# Patient Record
Sex: FEMALE
[De-identification: names, ages, dates, MRNs, and addresses within clinical notes are randomized per-mention and may not be internally consistent; named-entity substitution may affect disease eponyms.]

---

## 2017-10-09 ENCOUNTER — HOSPITAL ENCOUNTER (EMERGENCY)
Dept: HOSPITAL 42 - ED | Age: 37
Discharge: HOME | End: 2017-10-09
Payer: COMMERCIAL

## 2017-10-09 VITALS — HEART RATE: 64 BPM | DIASTOLIC BLOOD PRESSURE: 76 MMHG | SYSTOLIC BLOOD PRESSURE: 114 MMHG | OXYGEN SATURATION: 98 %

## 2017-10-09 VITALS — RESPIRATION RATE: 18 BRPM

## 2017-10-09 VITALS — TEMPERATURE: 98.5 F

## 2017-10-09 DIAGNOSIS — R07.0: ICD-10-CM

## 2017-10-09 DIAGNOSIS — J39.2: Primary | ICD-10-CM

## 2017-10-09 LAB
ALBUMIN/GLOB SERPL: 1.4 {RATIO} (ref 1.1–1.8)
ALP SERPL-CCNC: 77 U/L (ref 38–126)
ALT SERPL-CCNC: 24 U/L (ref 7–56)
APPEARANCE UR: CLEAR
AST SERPL-CCNC: 28 U/L (ref 14–36)
BACTERIA #/AREA URNS HPF: (no result) /[HPF]
BASOPHILS # BLD AUTO: 0.02 K/MM3 (ref 0–2)
BASOPHILS NFR BLD: 0.2 % (ref 0–3)
BILIRUB SERPL-MCNC: 0.4 MG/DL (ref 0.2–1.3)
BILIRUB UR-MCNC: NEGATIVE MG/DL
BUN SERPL-MCNC: 12 MG/DL (ref 7–21)
CALCIUM SERPL-MCNC: 9.2 MG/DL (ref 8.4–10.5)
CHLORIDE SERPL-SCNC: 106 MMOL/L (ref 98–107)
CO2 SERPL-SCNC: 25 MMOL/L (ref 21–33)
COLOR UR: YELLOW
EOSINOPHIL # BLD: 0.1 10*3/UL (ref 0–0.7)
EOSINOPHIL NFR BLD: 1.5 % (ref 1.5–5)
ERYTHROCYTE [DISTWIDTH] IN BLOOD BY AUTOMATED COUNT: 12.8 % (ref 11.5–14.5)
GLOBULIN SER-MCNC: 3.2 GM/DL
GLUCOSE SERPL-MCNC: 86 MG/DL (ref 70–110)
GLUCOSE UR STRIP-MCNC: NEGATIVE MG/DL
GRANULOCYTES # BLD: 6.76 10*3/UL (ref 1.4–6.5)
GRANULOCYTES NFR BLD: 70.2 % (ref 50–68)
HCT VFR BLD CALC: 40.8 % (ref 36–48)
KETONES UR STRIP-MCNC: NEGATIVE MG/DL
LEUKOCYTE ESTERASE UR-ACNC: NEGATIVE LEU/UL
LYMPHOCYTES # BLD: 2.2 10*3/UL (ref 1.2–3.4)
LYMPHOCYTES NFR BLD AUTO: 22.4 % (ref 22–35)
MCH RBC QN AUTO: 29.1 PG (ref 25–35)
MCHC RBC AUTO-ENTMCNC: 35 G/DL (ref 31–37)
MCV RBC AUTO: 83.1 FL (ref 80–105)
MONOCYTES # BLD AUTO: 0.6 10*3/UL (ref 0.1–0.6)
MONOCYTES NFR BLD: 5.7 % (ref 1–6)
PH UR STRIP: 6.5 [PH] (ref 4.7–8)
PLATELET # BLD: 308 10^3/UL (ref 120–450)
PMV BLD AUTO: 10 FL (ref 7–11)
POTASSIUM SERPL-SCNC: 3.7 MMOL/L (ref 3.6–5)
PROT SERPL-MCNC: 7.8 G/DL (ref 5.8–8.3)
PROT UR STRIP-MCNC: NEGATIVE MG/DL
RBC # UR STRIP: (no result) /UL
SODIUM SERPL-SCNC: 142 MMOL/L (ref 132–148)
SP GR UR STRIP: <= 1.005 (ref 1–1.03)
UROBILINOGEN UR STRIP-ACNC: 0.2 E.U./DL
WBC # BLD AUTO: 9.6 10^3/UL (ref 4.5–11)
WBC #/AREA URNS HPF: (no result) /HPF (ref 0–6)

## 2017-10-09 PROCEDURE — 80053 COMPREHEN METABOLIC PANEL: CPT

## 2017-10-09 PROCEDURE — 71010: CPT

## 2017-10-09 PROCEDURE — 81001 URINALYSIS AUTO W/SCOPE: CPT

## 2017-10-09 PROCEDURE — 70491 CT SOFT TISSUE NECK W/DYE: CPT

## 2017-10-09 PROCEDURE — 99283 EMERGENCY DEPT VISIT LOW MDM: CPT

## 2017-10-09 PROCEDURE — 85025 COMPLETE CBC W/AUTO DIFF WBC: CPT

## 2017-10-09 NOTE — CT
EXAM:

  CT Neck With Intravenous Contrast



EXAM DATE/TIME:

  10/9/2017 5:32 PM



CLINICAL HISTORY:

  The patient age is 36 years old and is female; Pain; Throat pain; Additional 

info: 2 month history worsening pain/difficulty swallow Facility exam id and 

description: Ct neckst neck soft tissue w/contrast



TECHNIQUE:

  Axial computed tomography images of the neck with intravenous contrast.  All 

CT scans at this facility use one or more dose reduction techniques, viz.: 

automated exposure control; ma/kV adjustment per patient size (including 

targeted exams where dose is matched to indication; i.e. head); or iterative 

reconstruction technique.

  Coronal and sagittal reformatted images were created and reviewed.



CONTRAST:

  100 mL of omni administered intravenously.



COMPARISON:

  No relevant prior studies available.



FINDINGS:

  Nasopharynx:  There is swelling/thickening of the right posterior wall of the 

nasopharynx and adjacent soft palate.  This may be inflammatory, although a 

mass cannot be excluded.

  Oropharynx:  See above.

  Hypopharynx:  No acute abnormality.

  Larynx:  Normal epiglottis.

  Trachea:  No acute abnormality.

  Retropharyngeal space:  No acute abnormality.

  Submandibular/parotid glands:  Small intraparotid lymph nodes/nodules are 

visualized, which are subcentimeter in size.

  Thyroid:  No enlarged or calcified nodules.

  Bones/joints:  There is straightening of the lordotic curvature of the 

cervical spine.  

  Vasculature:  No acute findings.

  Lymph nodes:  Mild enlarged level I cervical lymph nodes are identified 

bilaterally.  On the left side, this measures 1.6 x 0.6 cm.  These lymph nodes 

are nonspecific as to etiology.  Additional small cervical lymph nodes are 

identified.

  Sinuses:  Mucous retention cysts or polyps are visualized within the left 

maxillary sinus.

  Lung apices:  Unremarkable as visualized.



IMPRESSION:     

1.  There is swelling/thickening of the right posterior wall of the nasopharynx 

and adjacent soft palate.  This may be inflammatory, although a mass cannot be 

excluded.  Further clinical evaluation is recommended.

2.  Mild enlarged level I cervical lymph nodes are identified bilaterally. 

These lymph nodes are nonspecific as to etiology.

3.  Small intraparotid lymph nodes/nodules are visualized, which are 

subcentimeter in size.

4.  Paranasal sinus disease is noted above.

## 2017-10-09 NOTE — ED PDOC
Arrival/HPI





- General


Chief Complaint: ENT Problem


Time Seen by Provider: 10/09/17 17:24


Historian: Patient, Family





- History of Present Illness


Narrative History of Present Illness (Text): 





10/09/17 18:40


36yr old female presents today sent in by [place] for evaluation of throat 

pain. pt states she has had painful swallowing x 2 months.  + decreased hearing 

x 2 month. + nasal congestion. + dry cough. pt also c/o 1 week history of left 

sided back pain. no cp or sob. no vomiting/diarrhea. pt also states that she 

has been drinking 15 bottles of water daily. denies urinary symptoms. no other 

complaints. 


10/09/17 18:43








Past Medical History





- Provider Review


Nursing Documentation Reviewed: Yes





- Travel History


Have you recently traveled outside [place] w/in the past 3 mons?: No





- Infectious Disease


Hx of Infectious Diseases: None





- Tetanus Immunization


Tetanus Immunization: Unknown





- Psychiatric


Hx Substance Use: No





- Anesthesia


Hx Anesthesia: No





Family/Social History





- Physician Review


Nursing Documentation Reviewed: Yes


Family/Social History: Unknown Family HX


Smoking Status: Never Smoked


Hx Alcohol Use: No


Hx Substance Use: No





Allergies/Home Meds


Allergies/Adverse Reactions: 


Allergies





No Known Allergies Allergy (Verified 10/09/17 17:16)


 








Home Medications: 


 Home Meds











 Medication  Instructions  Recorded  Confirmed


 


No Known Home Med  10/09/17 10/09/17














Review of Systems





- Review of Systems


Constitutional: Fevers.  absent: Fatigue


ENT: Sore Throat, Sinus Congestion


Respiratory: Cough.  absent: SOB, Wheezing


Cardiovascular: absent: Chest Pain, Palpitations


Gastrointestinal: absent: Abdominal Pain, Nausea, Vomiting


Musculoskeletal: Back Pain.  absent: Arthralgias, Neck Pain


Skin: absent: Rash, Pruritis


Neurological: absent: Headache, Dizziness





Physical Exam


Vital Signs Reviewed: Yes


Vital Signs











  Temp Pulse Resp BP Pulse Ox


 


 10/09/17 18:20   74  18  118/79  100


 


 10/09/17 17:20  98.5 F  78  19  120/81  100











Temperature: Afebrile


Blood Pressure: Normal


Pulse: Regular


Respiratory Rate: Normal


Appearance: Positive for: Well-Appearing, Non-Toxic, Comfortable


Pain Distress: None


Mental Status: Positive for: Alert and Oriented X 3





- Systems Exam


Head: Present: Atraumatic


Conjunctiva: Present: Normal


Ears: Present: Normal, NORMAL TM, Normal Canal.  No: Erythema


Mouth: Present: Moist Mucous Membranes, Normal Lips, Normal Tounge, Normal 

Teeth.  No: Dry, Drooling, Trismus


Pharnyx: Present: Normal.  No: ERYTHEMA, EXUDATE, TONSILS ENLARGED, 

Peritonsilar Swelling, Uvular Deviation, Muffled/Hoarse Voice, Strider, Soft 

Palate/Uvular Edema


Nose (External): Present: Atraumatic


Nose (Internal): Present: Normal Inspection


Neck: Present: Normal Range of Motion, Lymphadenopathy, Trachea Midline


Respiratory/Chest: Present: Clear to Auscultation, Good Air Exchange.  No: 

Respiratory Distress, Accessory Muscle Use


Cardiovascular: Present: Regular Rate and Rhythm, Normal S1, S2.  No: Murmurs


Abdomen: No: Tenderness


Back: Present: Normal Inspection, Other (+ minimal ttp over left upper back; no 

step offs or crepitus, no edema no erythema, no ecchymosis; no rash. ).  No: 

CVA Tenderness, Midline Tenderness


Upper Extremity: Present: Normal ROM


Lower Extremity: Present: Normal ROM


Neurological: Present: GCS=15, Speech Normal


Skin: Present: Warm, Dry, Normal Color.  No: Rashes


Lymphatic: Present: Cervical Adenopathy


Psychiatric: Present: Alert, Oriented x 3





Medical Decision Making


ED Course and Treatment: 





10/09/17 18:45


36yr old female with 2 month history of difficulty swallowing, b/l ear pain.





cbc: wnl


cmp; wnl 





UA: 





cxr: wnl





ct soft tissue neck; FINDINGS:


Nasopharynx: There is swelling/thickening of the right posterior wall of the 

nasopharynx and


adjacent soft palate. This may be inflammatory, although a mass cannot be 

excluded.


Oropharynx: See above.


Hypopharynx: No acute abnormality.


Larynx: Normal epiglottis.


Trachea: No acute abnormality.


Retropharyngeal space: No acute abnormality.


Submandibular/parotid glands: Small intraparotid lymph nodes/nodules are 

visualized, which are


subcentimeter in size.


Thyroid: No enlarged or calcified nodules.


Bones/joints: There is straightening of the lordotic curvature of the cervical 

spine.


Vasculature: No acute findings.


Lymph nodes: Mild enlarged level I cervical lymph nodes are identified 

bilaterally. On the left side,


this measures 1.6 x 0.6 cm. These lymph nodes are nonspecific as to etiology. 

Additional small


cervical lymph nodes are identified.


Sinuses: Mucous retention cysts or polyps are visualized within the left 

maxillary sinus.


Lung apices: Unremarkable as visualized.


IMPRESSION:


1. There is swelling/thickening of the right posterior wall of the nasopharynx 

and adjacent soft palate.


This may be inflammatory, although a mass cannot be excluded. Further clinical 

evaluation is


recommended.


2. Mild enlarged level I cervical lymph nodes are identified bilaterally. These 

lymph nodes are


nonspecific as to etiology.


3. Small intraparotid lymph nodes/nodules are visualized, which are 

subcentimeter in size.


4. Paranasal sinus disease is noted above.








i discussed case in depth with dr. nava; (ENT on call); he states that 

patient will need f/u outpatient for scope. pt can f/u in the office or at 

[facility] ENT clinic. 





10/09/17 21:15


all results discussed in depth with patient and boyfriend using  phone

;  #[id number]. 


advised patient and boyfriend of CT findings of inflammation vs Mass. stressed 

importance of f/u with ENT specialist. 








impression; possible nasopharyneal mass


Follow up with ENT specialist within the next 2 days. 


Follow up with the primary care physician within the next 2 days. 


return immediately if symptoms worsen, persist or if new symptoms develop. 








- Lab Interpretations


Lab Results: 








 10/09/17 18:30 





 10/09/17 18:30 





 Lab Results





10/09/17 18:30: WBC 9.6, RBC 4.91, Hgb 14.3, Hct 40.8, MCV 83.1, MCH 29.1, MCHC 

35.0, RDW 12.8, Plt Count 308, MPV 10.0, Gran % 70.2 H, Lymph % (Auto) 22.4, 

Mono % (Auto) 5.7, Eos % (Auto) 1.5, Baso % (Auto) 0.2, Gran # 6.76 H, Lymph # 

2.2, Mono # 0.6, Eos # 0.1, Baso # 0.02


10/09/17 18:30: Sodium 142, Potassium 3.7, Chloride 106, Carbon Dioxide 25, 

Anion Gap 15, BUN 12, Creatinine 0.6 L, Est GFR ( Amer) > 60, Est GFR (

Non-Af Amer) > 60, Random Glucose 86, Calcium 9.2, Total Bilirubin 0.4, AST 28, 

ALT 24, Alkaline Phosphatase 77, Total Protein 7.8, Albumin 4.6, Globulin 3.2, 

Albumin/Globulin Ratio 1.4











- RAD Interpretation


Radiology Orders: 








10/09/17 17:32


NECK SOFT TISSUE W/CONTRAST [CT] Stat 


CHEST PORTABLE [RAD] Stat 














- Medication Orders


Current Medication Orders: 











Discontinued Medications





Sodium Chloride (Sodium Chloride 0.9%)  500 mls @ 999 mls/hr IV .Q31M STA


   Stop: 10/09/17 18:03


   Last Admin: 10/09/17 18:30  Dose: 999 mls/hr





eMAR Start Stop


 Document     10/09/17 18:30  LA  (Rec: 10/09/17 18:39  LA  [id number])


     Intravenous Solution


      Start Date                                 10/09/17


      Start Time                                 18:30


      End Date                                   10/09/17


      End time                                   19:00


      Total Infusion Time                        30














Disposition/Present on Arrival





- Present on Arrival


Any Indicators Present on Arrival: No


History of DVT/PE: No


History of Uncontrolled Diabetes: No


Urinary Catheter: No


History of Decub. Ulcer: No


History Surgical Site Infection Following: None





- Disposition


Have Diagnosis and Disposition been Completed?: Yes


Diagnosis: 


 Nasopharyngeal mass, Throat pain





Disposition: HOME/ ROUTINE


Disposition Time: 21:22


Patient Plan: Discharge


Condition: GOOD


Additional Instructions: 


Follow up with ENT specialist within the next 2 days. 


Follow up with the primary care physician within the next 2 days. 


return immediately if symptoms worsen, persist or if new symptoms develop. 


Referrals: 


Dereck Nava DO [Doctor Osteopathy] - Follow up with primary


Osbaldo James MD [Staff Provider] - Follow up with primary


[facility] [place] - Follow up with primary


[facility] [Outside] - Follow up with primary


Forms:  [employer] (Faroese), WORK NOTE

## 2018-04-23 ENCOUNTER — HOSPITAL ENCOUNTER (EMERGENCY)
Dept: HOSPITAL 42 - ED | Age: 38
Discharge: HOME | End: 2018-04-23
Payer: COMMERCIAL

## 2018-04-23 VITALS — RESPIRATION RATE: 18 BRPM | TEMPERATURE: 97.9 F

## 2018-04-23 VITALS — SYSTOLIC BLOOD PRESSURE: 125 MMHG | HEART RATE: 72 BPM | DIASTOLIC BLOOD PRESSURE: 74 MMHG | OXYGEN SATURATION: 100 %
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
## 2018-04-23 DIAGNOSIS — O23.40: Primary | ICD-10-CM

## 2018-04-23 DIAGNOSIS — Z3A.00: ICD-10-CM

## 2018-04-23 DIAGNOSIS — S86.912A: ICD-10-CM
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
## 2018-04-23 LAB
ALBUMIN SERPL-MCNC: 4.1 G/DL (ref 3–4.8)
ALBUMIN/GLOB SERPL: 1.5 {RATIO} (ref 1.1–1.8)
ALT SERPL-CCNC: 29 U/L (ref 7–56)
APPEARANCE UR: CLEAR
AST SERPL-CCNC: 36 U/L (ref 14–36)
BILIRUB UR-MCNC: NEGATIVE MG/DL
BUN SERPL-MCNC: 4 MG/DL (ref 7–21)
CALCIUM SERPL-MCNC: 8.6 MG/DL (ref 8.4–10.5)
COLOR UR: YELLOW
EPI CELLS #/AREA URNS HPF: (no result) /HPF (ref 0–5)
ERYTHROCYTE [DISTWIDTH] IN BLOOD BY AUTOMATED COUNT: 13.1 % (ref 11.5–14.5)
GFR NON-AFRICAN AMERICAN: > 60
GLUCOSE UR STRIP-MCNC: NEGATIVE MG/DL
HCG,QUALITATIVE URINE: POSITIVE
HGB BLD-MCNC: 12.2 G/DL (ref 12–16)
LEUKOCYTE ESTERASE UR-ACNC: NEGATIVE LEU/UL
MCH RBC QN AUTO: 28.1 PG (ref 25–35)
MCHC RBC AUTO-ENTMCNC: 34.9 G/DL (ref 31–37)
MCV RBC AUTO: 80.6 FL (ref 80–105)
PH UR STRIP: 6.5 [PH] (ref 4.7–8)
PLATELET # BLD: 266 10^3/UL (ref 120–450)
PMV BLD AUTO: 9.2 FL (ref 7–11)
PROT UR STRIP-MCNC: NEGATIVE MG/DL
RBC # BLD AUTO: 4.34 10^6/UL (ref 3.5–6.1)
RBC # UR STRIP: (no result) /UL
RBC #/AREA URNS HPF: (no result) /HPF (ref 0–2)
SP GR UR STRIP: <= 1.005 (ref 1–1.03)
UROBILINOGEN UR STRIP-ACNC: 0.2 E.U./DL
WBC # BLD AUTO: 8.7 10^3/UL (ref 4.5–11)
WBC #/AREA URNS HPF: (no result) /HPF (ref 0–6)

## 2018-04-23 PROCEDURE — 76817 TRANSVAGINAL US OBSTETRIC: CPT

## 2018-04-23 PROCEDURE — 86850 RBC ANTIBODY SCREEN: CPT

## 2018-04-23 PROCEDURE — 81001 URINALYSIS AUTO W/SCOPE: CPT

## 2018-04-23 PROCEDURE — 99284 EMERGENCY DEPT VISIT MOD MDM: CPT

## 2018-04-23 PROCEDURE — 80053 COMPREHEN METABOLIC PANEL: CPT

## 2018-04-23 PROCEDURE — 86900 BLOOD TYPING SEROLOGIC ABO: CPT

## 2018-04-23 PROCEDURE — 84702 CHORIONIC GONADOTROPIN TEST: CPT

## 2018-04-23 PROCEDURE — 85027 COMPLETE CBC AUTOMATED: CPT

## 2018-04-23 PROCEDURE — 84703 CHORIONIC GONADOTROPIN ASSAY: CPT

## 2018-04-23 PROCEDURE — 93970 EXTREMITY STUDY: CPT

## 2018-04-23 NOTE — US
EXAM:

  US Pregnancy First Trimester, Transabdominal

  US Pregnancy, Transvaginal



CLINICAL HISTORY:

  37 years old, female; Pain; Pregnancy complicated by abdominal or pelvic 

pain; Lower; First trimester; Gestational age or lmp: 03/01/2018; Pregnant



TECHNIQUE:

  Real-time transabdominal and transvaginal obstetrical ultrasound of the 

maternal pelvis and a first trimester pregnancy with image documentation.  

Transvaginal imaging was used for better evaluation of the fetus and adnexa.



COMPARISON:

  No relevant prior studies available.



FINDINGS:

  Gestation:  0.5 x 0.4 x 0.5 cm saclike structure within uterus. No yolk sac. 

No fetal pole.

  Uterus/cervix:  Retroverted uterus.  Two uterine masses, larger measuring 3.2 

x 3.3 x 4.6 cm.  Endometrium: 2.9 cm in thickness.  Closed cervix.

  Ovaries:  Normal ovaries.  No adnexal masses.

  Free fluid:  No significant free fluid.



IMPRESSION:     

1.  Sac without fetal pole or yolk sac. DDX: Early IUP, blighted ovum, ectopic 

pregnancy (with pseudogestational sac).  Followup is recommended.

2.  Probable fibroid uterus.

## 2018-04-23 NOTE — US
HISTORY:

Leg pain and swelling. Evaluate for DVT



PHYSICIAN(S):  Jethro Patel MD.



TECHNIQUE:

Duplex sonography and color-flow Doppler with graded compression were 

used to evaluate the deep venous systems of both lower extremities. 



FINDINGS:

The visualized deep venous systems of both lower extremities are 

sonographically normal and compressible. Normal wave forms and 

augmentation are seen. There is no sonographic evidence for deep 

venous thrombosis in the visualized segments of both lower 

extremities.



IMPRESSION:

No sonographic evidence for deep venous thrombosis in the visualized 

segments of both lower extremities.

## 2018-04-23 NOTE — ED PDOC
Arrival/HPI





- General


Chief Complaint: Abdominal Pain


Time Seen by Provider: 04/23/18 04:24


Historian: Patient, Family





- History of Present Illness


Narrative History of Present Illness (Text): 


04/23/18 04:41


Zenia Wright is a 37 year old female who presents to the Emergency 

department accompanied by relative complaining of dysuria for the past 2 days, 

Patient states her last normal menstrual period was on 03/20/2018. Patient also 

complaining left lower leg pain for the past 2 days. Patient denies any fever, 

chills, chest pain, shortness of breath, nausea, vomiting, diarrhea, neck pain, 

headache, dizziness,vaginal discharge or any other complaints.





Symptom Onset: Gradual


Symptom Course: Unchanged


Activities at Onset: Light


Context: Home





Past Medical History





- Provider Review


Nursing Documentation Reviewed: Yes





- Infectious Disease


Hx of Infectious Diseases: None





- Tetanus Immunization


Tetanus Immunization: Unknown





- Psychiatric


Hx Substance Use: No





- Anesthesia


Hx Anesthesia: No





Family/Social History





- Physician Review


Nursing Documentation Reviewed: Yes


Family/Social History: Unknown Family HX


Smoking Status: Never Smoked


Hx Alcohol Use: No


Hx Substance Use: No





Allergies/Home Meds


Allergies/Adverse Reactions: 


Allergies





No Known Allergies Allergy (Verified 04/23/18 04:00)


 











Review of Systems





- Physician Review


All systems were reviewed & negative as marked: Yes





- Review of Systems


Constitutional: Normal.  absent: Fevers


Eyes: Normal


ENT: Normal


Respiratory: Normal.  absent: SOB, Cough


Cardiovascular: Normal.  absent: Chest Pain


Gastrointestinal: Normal.  absent: Abdominal Pain, Diarrhea, Nausea, Vomiting


Genitourinary Female: Dysuria.  absent: Hematuria, Urine Output Changes, 

Vaginal Bleeding


Musculoskeletal: Other (+left leg pain).  absent: Back Pain, Neck Pain


Skin: Normal.  absent: Rash


Neurological: Normal.  absent: Headache, Dizziness


Endocrine: Normal


Hemo/Lymphatic: Normal


Psychiatric: Normal





Physical Exam


Vital Signs Reviewed: Yes


Vital Signs











  Temp Pulse Resp BP Pulse Ox


 


 04/23/18 05:46   84  18  118/74  99


 


 04/23/18 04:01  97.9 F  87  18  120/75  98











Temperature: Afebrile


Blood Pressure: Normal


Pulse: Regular


Respiratory Rate: Normal


Appearance: Positive for: Well-Appearing, Non-Toxic, Comfortable


Pain Distress: None


Mental Status: Positive for: Alert and Oriented X 3





- Systems Exam


Head: Present: Atraumatic, Normocephalic


Pupils: Present: PERRL


Extroacular Muscles: Present: EOMI


Conjunctiva: Present: Normal


Mouth: Present: Moist Mucous Membranes


Neck: Present: Normal Range of Motion


Respiratory/Chest: Present: Clear to Auscultation, Good Air Exchange.  No: 

Respiratory Distress, Accessory Muscle Use


Cardiovascular: Present: Regular Rate and Rhythm, Normal S1, S2.  No: Murmurs


Abdomen: No: Tenderness, Distention, Peritoneal Signs


Back: Present: Normal Inspection.  No: CVA Tenderness, Midline Tenderness, 

Paraspinal Tenderness


Upper Extremity: Present: Normal Inspection.  No: Cyanosis, Edema


Lower Extremity: Present: Normal Inspection, NORMAL PULSES, Normal ROM, 

Neurovascularly Intact.  No: Edema, CALF TENDERNESS, Cyanosis, Be's Sign, 

Tenderness, Swelling, Erythema, Deformity, Temperature Abnormalties


Neurological: Present: GCS=15, CN II-XII Intact, Speech Normal, Motor Func 

Grossly Intact, Normal Sensory Function


Skin: Present: Warm, Dry, Normal Color.  No: Rashes


Psychiatric: Present: Alert, Oriented x 3, Normal Insight, Normal Concentration





Medical Decision Making


ED Course and Treatment: 


04/23/18 04:41


Impression:


37 year old female complaining of dysuria and left leg pain x 2 days.  





Plan:


-- Labs, blood type and screen, Beta-HCG


-- Urinalysis


-- Transvaginal US


-- US Duplex Lower Extremities


-- Reassess and disposition





Progress Notes:





04/23/18 06:38


Pt. feels fine in ED.Pt. is G2 AB1.Will commence on Macrobid antibiotics for 

UTI.Pt. with early pregnancy advised follow up with her gynecologist this 

week.States she will do so.





- Lab Interpretations


Lab Results: 








 04/23/18 04:50 





 04/23/18 04:50 





 Lab Results





04/23/18 04:50: Blood Type O POSITIVE, Antibody Screen Negative, BBK History 

Checked No verified bt


04/23/18 04:50: Beta HCG, Quant 1009.10 H


04/23/18 04:50: WBC 8.7, RBC 4.34, Hgb 12.2  D, Hct 35.0 L, MCV 80.6, MCH 28.1, 

MCHC 34.9, RDW 13.1, Plt Count 266, MPV 9.2


04/23/18 04:50: Sodium 143, Potassium 3.5 L, Chloride 108 H, Carbon Dioxide 22, 

Anion Gap 16, BUN 4 L, Creatinine 0.6 L, Est GFR ( Amer) > 60, Est GFR (

Non-Af Amer) > 60, Random Glucose 102, Calcium 8.6, Total Bilirubin 0.3, AST 36

  D, ALT 29, Alkaline Phosphatase 50, Total Protein 6.9, Albumin 4.1, Globulin 

2.8, Albumin/Globulin Ratio 1.5


04/23/18 04:20: Urine Color Yellow, Urine Appearance Clear, Urine pH 6.5, Ur 

Specific Gravity <= 1.005, Urine Protein Negative, Urine Glucose (UA) Negative, 

Urine Ketones Negative, Urine Blood Trace-intact H, Urine Nitrate Negative, 

Urine Bilirubin Negative, Urine Urobilinogen 0.2, Ur Leukocyte Esterase Negative

, Urine RBC 0 - 2, Urine WBC 0 - 2, Ur Epithelial Cells 0 - 2, Urine HCG, Qual 

Positive











- RAD Interpretation


Narrative RAD Interpretations (Text): 





04/23/18 06:17


Venous doppler lower extremities-Negative for DVT


Radiology Orders: 








04/23/18 04:45


DUPLEX LOWER EXTRM VEIN BILAT [US] Stat 





04/23/18 04:46


OB TRANSVAGINAL PREGNANCY [US] Stat 














- Medication Orders


Current Medication Orders: 








Sodium Chloride (Sodium Chloride 0.9%)  500 mls @ 500 mls/hr IV .Q1H STA


   Stop: 04/23/18 07:20


   Last Admin: 04/23/18 06:29  Dose: 500 mls/hr





eMAR Start Stop


 Document     04/23/18 06:29  KEN  (Rec: 04/23/18 06:29  J[facility]  [id number]


     Intravenous Solution


      Start Date                                 04/23/18


      Start Time                                 06:29


      End Date                                   04/23/18


      End time                                   06:59


      Total Infusion Time                        30





Nitrofurantoin Macrocrystals (Macrobid)  100 mg PO ONCE ONE


   PRN Reason: Protocol


   Stop: 04/23/18 06:37











- Scribe Statement


The provider has reviewed the documentation as recorded by the Teodoroibdariusz Biswas





Provider Scribe Attestation:


All medical record entries made by the Scribe were at my direction and 

personally dictated by me. I have reviewed the chart and agree that the record 

accurately reflects my personal performance of the history, physical exam, 

medical decision making, and the department course for this patient. I have 

also personally directed, reviewed, and agree with the discharge instructions 

and disposition.








Disposition/Present on Arrival





- Present on Arrival


Any Indicators Present on Arrival: No


History of DVT/PE: No


History of Uncontrolled Diabetes: No


Urinary Catheter: No


History of Decub. Ulcer: No


History Surgical Site Infection Following: None





- Disposition


Have Diagnosis and Disposition been Completed?: Yes


Diagnosis: 


 Early stage of pregnancy, UTI (urinary tract infection), Muscle strain, lower 

leg





Disposition: HOME/ ROUTINE


Disposition Time: 06:40


Patient Plan: Discharge


Condition: STABLE


Discharge Instructions (ExitCare):  Urinary Tract Infection, Adult (DC), 

Pregnancy - The First Month, Muscle Strain (DC)


Additional Instructions: 


Drink plenty of liquids/take meds as prescribed/avoid strenuous physical 

activity/follow up with your gynecologist this week


Prescriptions: 


Nitrofurantoin Macrocrystals [Macrobid] 100 mg PO BID #6 cap


Forms:  [employer] (English)

## 2018-05-09 ENCOUNTER — HOSPITAL ENCOUNTER (EMERGENCY)
Dept: HOSPITAL 42 - ED | Age: 38
Discharge: HOME | End: 2018-05-09
Payer: COMMERCIAL

## 2018-05-09 VITALS
RESPIRATION RATE: 19 BRPM | SYSTOLIC BLOOD PRESSURE: 125 MMHG | DIASTOLIC BLOOD PRESSURE: 80 MMHG | HEART RATE: 90 BPM | TEMPERATURE: 98 F

## 2018-05-09 VITALS — BODY MASS INDEX: 26 KG/M2

## 2018-05-09 VITALS — OXYGEN SATURATION: 100 %

## 2018-05-09 DIAGNOSIS — Z3A.01: ICD-10-CM

## 2018-05-09 DIAGNOSIS — O20.0: Primary | ICD-10-CM

## 2018-05-09 DIAGNOSIS — O98.811: ICD-10-CM

## 2018-05-09 LAB
ALBUMIN SERPL-MCNC: 4.7 G/DL (ref 3–4.8)
ALBUMIN/GLOB SERPL: 1.4 {RATIO} (ref 1.1–1.8)
ALT SERPL-CCNC: 54 U/L (ref 7–56)
APPEARANCE UR: CLEAR
AST SERPL-CCNC: 33 U/L (ref 14–36)
BASOPHILS # BLD AUTO: 0.01 K/MM3 (ref 0–2)
BASOPHILS NFR BLD: 0.1 % (ref 0–3)
BILIRUB UR-MCNC: NEGATIVE MG/DL
BUN SERPL-MCNC: 5 MG/DL (ref 7–21)
CALCIUM SERPL-MCNC: 9.1 MG/DL (ref 8.4–10.5)
COLOR UR: YELLOW
EOSINOPHIL # BLD: 0.1 10*3/UL (ref 0–0.7)
EOSINOPHIL NFR BLD: 0.5 % (ref 1.5–5)
ERYTHROCYTE [DISTWIDTH] IN BLOOD BY AUTOMATED COUNT: 13.2 % (ref 11.5–14.5)
GFR NON-AFRICAN AMERICAN: > 60
GLUCOSE UR STRIP-MCNC: NEGATIVE MG/DL
GRANULOCYTES # BLD: 8.85 10*3/UL (ref 1.4–6.5)
GRANULOCYTES NFR BLD: 74.2 % (ref 50–68)
HGB BLD-MCNC: 13.3 G/DL (ref 12–16)
LEUKOCYTE ESTERASE UR-ACNC: NEGATIVE LEU/UL
LYMPHOCYTES # BLD: 2.4 10*3/UL (ref 1.2–3.4)
LYMPHOCYTES NFR BLD AUTO: 19.8 % (ref 22–35)
MCH RBC QN AUTO: 28.7 PG (ref 25–35)
MCHC RBC AUTO-ENTMCNC: 35.4 G/DL (ref 31–37)
MCV RBC AUTO: 81 FL (ref 80–105)
MONOCYTES # BLD AUTO: 0.7 10*3/UL (ref 0.1–0.6)
MONOCYTES NFR BLD: 5.4 % (ref 1–6)
PH UR STRIP: 6 [PH] (ref 4.7–8)
PLATELET # BLD: 294 10^3/UL (ref 120–450)
PMV BLD AUTO: 9.2 FL (ref 7–11)
PROT UR STRIP-MCNC: NEGATIVE MG/DL
RBC # BLD AUTO: 4.64 10^6/UL (ref 3.5–6.1)
RBC # UR STRIP: (no result) /UL
RBC #/AREA URNS HPF: (no result) /HPF (ref 0–2)
SP GR UR STRIP: <= 1.005 (ref 1–1.03)
UROBILINOGEN UR STRIP-ACNC: 0.2 E.U./DL
WBC # BLD AUTO: 11.9 10^3/UL (ref 4.5–11)
WBC #/AREA URNS HPF: NEGATIVE /HPF (ref 0–6)

## 2018-05-09 NOTE — ED PDOC
Arrival/HPI





<Rachelle Sanchez - Last Filed: 18 21:07>





<Arielle Razo - Last Filed: 18 21:39>





- General


Chief Complaint: Abdominal Pain


Time Seen by Provider: 18 18:43





- History of Present Illness


Narrative History of Present Illness (Text): 





18 19:19


Patient is a 37 year old pregnant female with a past medical history of 

miscarriage at 2 months in  who presents to the Emergency department 

complaining of abdominal pain, and vaginal discharge with bleeding. Patient 

says this started a few weeks ago. At that time she came to the hospital and 

was given monostat 3 and augmentin, and also found out she was pregnant. 

Patient took the medications as directed finishing them 1 week ago, but her 

"belly button pain" worsened to the point where she felt like her " belly 

button was going to explode". Patient says the thick yellow vaginal discharge 

did not resolve either and yesterday she noticed red streaking throughout the 

discharge. Patient admits to chills and vomiting. She denies fever, dizziness, 

headache, diarrhea, constipation, changes in urinary habits, vaginal lesions, 

and chest pain/shortness of breath.





PMH: denies 


Meds: denies (other than those temporary listed above)


Allergies: NKDA


PSH: denies 


FH: denies 


SH: denies tobacco, alcohol, and elicit drug use


18 20:32


 (Rachelle Sanchez)





Past Medical History





- Infectious Disease


Hx of Infectious Diseases: None





- Tetanus Immunization


Tetanus Immunization: Unknown





- Psychiatric


Hx Substance Use: No





- Anesthesia


Hx Anesthesia: No





<Rachelle Sanchez - Last Filed: 18 21:07>





Family/Social History


Family/Social History: No Known Family HX


Smoking Status: Never Smoked


Hx Alcohol Use: No


Hx Substance Use: No





<Rachelle Sanchez - Last Filed: 18 21:07>





Allergies/Home Meds





<Rachelle Sanchez - Last Filed: 18 21:07>





<Arielle Razo - Last Filed: 18 21:39>


Allergies/Adverse Reactions: 


Allergies





No Known Allergies Allergy (Verified 18 04:00)


 











Review of Systems





- Physician Review


All systems were reviewed & negative as marked: Yes





- Review of Systems


Constitutional: Normal


Eyes: Normal


ENT: Rhinorrhea


Respiratory: Normal


Cardiovascular: Normal


Gastrointestinal: Abdominal Pain, Vomiting.  absent: Constipation, Diarrhea, 

Hematemesis


Genitourinary Female: Vaginal Bleeding, Vaginal Discharge.  absent: Dysuria, 

Frequency, Hematuria


Musculoskeletal: Normal


Skin: Normal


Neurological: Normal


Psychiatric: Anxiety (worried she will lose this pregnancy)





<Rachelle Sanchez - Last Filed: 18 21:07>





Physical Exam





- Systems Exam


Head: Present: Atraumatic, Normocephalic


Pupils: Present: PERRL


Extroacular Muscles: Present: EOMI


Conjunctiva: Present: Normal


Mouth: Present: Moist Mucous Membranes


Neck: Present: Normal Range of Motion


Respiratory/Chest: Present: Clear to Auscultation, Good Air Exchange.  No: 

Respiratory Distress, Accessory Muscle Use


Cardiovascular: Present: Regular Rate and Rhythm, Normal S1, S2.  No: Murmurs


Abdomen: Present: Tenderness (periumbilical), Guarding (voluntary).  No: 

Distention, Peritoneal Signs


Upper Extremity: Present: Normal Inspection


Lower Extremity: Present: Normal Inspection


Neurological: Present: GCS=15, Speech Normal


Skin: Present: Warm, Dry, Normal Color.  No: Rashes


Psychiatric: Present: Alert, Oriented x 3, Normal Insight, Normal Concentration





<Rachelle Sanchez - Last Filed: 18 21:07>


Vital Signs











  Temp Pulse Resp BP Pulse Ox


 


 18 18:33  98.2 F  91 H  18  122/79  100














Medical Decision Making





<Rachelle Sanchez - Last Filed: 18 21:07>





<Arielle Razo - Last Filed: 18 21:39>


ED Course and Treatment: 


Patient Seen With Resident:


In agreement with resident note which contains more details about the patient. 

Patient was seen and evaluated with resident. Came up with plan and treatment 

together. Vaginal discharge is white and clumpy with itchiness.  





18 20:59


Gestation: Single live intrauterine pregnancy. Ultrasound image is 6 weeks and 

4 days. Fetal heart


rate is 143 beats per minute.


Placenta/amniotic fluid: Cannot be adequately evaluated due to the early 

gestational age.


Uterus/cervix: myomas noted, intramural left 4.1 x 3.6 cm myoma and 1.1 x 1.2 

cm anterior


intramural myoma.


Ovaries: Cysts in the left ovary measuring 2.3 x 2 cm.


Free fluid: No free fluid.


IMPRESSION:


Single live intrauterine pregnancy. Ultrasound image is 6 weeks and 4 days. 

Fetal heart rate is 143


beats per minute.





18 21:37


 (Arielle Razo)





- Lab Interpretations


Lab Results: 








 18 18:55 





 18 18:55 





 Lab Results





18 18:55: Blood Type O POSITIVE, Antibody Screen Negative, BBK History 

Checked Patient has bt


18 18:55: Beta HCG, Quant [id number] H


18 18:55: Sodium 144, Potassium 3.5 L, Chloride 106, Carbon Dioxide 20 L, 

Anion Gap 21 H, BUN 5 L, Creatinine 0.5 L, Est GFR ( Amer) > 60, Est GFR 

(Non-Af Amer) > 60, Random Glucose 93, Calcium 9.1, Total Bilirubin 0.4, AST 33

, ALT 54, Alkaline Phosphatase 56, Total Protein 8.0, Albumin 4.7, Globulin 3.3

, Albumin/Globulin Ratio 1.4


18 18:55: Urine Color Yellow, Urine Appearance Clear, Urine pH 6.0, Ur 

Specific Gravity <= 1.005, Urine Protein Negative, Urine Glucose (UA) Negative, 

Urine Ketones 15 H, Urine Blood Small H, Urine Nitrate Negative, Urine 

Bilirubin Negative, Urine Urobilinogen 0.2, Ur Leukocyte Esterase Negative, 

Urine RBC 0 - 2, Urine WBC Negative


18 18:55: WBC 11.9 H D, RBC 4.64, Hgb 13.3, Hct 37.6, MCV 81.0, MCH 28.7, 

MCHC 35.4, RDW 13.2, Plt Count 294, MPV 9.2, Gran % 74.2 H, Lymph % (Auto) 19.8 

L, Mono % (Auto) 5.4, Eos % (Auto) 0.5 L, Baso % (Auto) 0.1, Gran # 8.85 H, 

Lymph # (Auto) 2.4, Mono # (Auto) 0.7 H, Eos # (Auto) 0.1, Baso # (Auto) 0.01











- RAD Interpretation


Radiology Orders: 








18 18:43


OB TRANSVAGINAL PREGNANCY [US] Stat 














- PA / NP / Resident Statement


MD/DO has reviewed & agrees with the documentation as recorded.


MD/DO has examined the patient and agrees with the treatment plan.





<Rachelle Sanchez - Last Filed: 18 21:07>





- Scribe Statement


The provider has reviewed the documentation as recorded by the Scribe





<Arielle Razo - Last Filed: 18 21:39>





- Scribe Statement


Kelsie Lima





Provider Scribe Attestation:


All medical record entries made by the Scribe were at my direction and 

personally dictated by me. I have reviewed the chart and agree that the record 

accurately reflects my personal performance of the history, physical exam, 

medical decision making, and the department course for this patient. I have 

also personally directed, reviewed, and agree with the discharge instructions 

and disposition.





 (Arielle Razo)





Disposition/Present on Arrival





- Present on Arrival


History of DVT/PE: No


History of Uncontrolled Diabetes: No


Urinary Catheter: No


History of Decub. Ulcer: No


History Surgical Site Infection Following: None





<DanielRachelle - Last Filed: 18 21:07>





- Present on Arrival


Any Indicators Present on Arrival: No





- Disposition


Have Diagnosis and Disposition been Completed?: Yes


Disposition Time: 20:59


Patient Plan: Discharge





<Arielle Razo - Last Filed: 18 21:39>





- Disposition


Diagnosis: 


 Threatened , Yeast infection





Disposition: HOME/ ROUTINE


Patient Problems: 


 Current Active Problems











Problem Status Onset


 


Threatened  Acute  











Condition: GOOD


Discharge Instructions (ExitCare):  Threatened Miscarriage


Print Language: Surinamese


Additional Instructions: 


Follow-up with OB within 2 days.  Start monistat 7.  Return to ED if condition 

worsens.  


Referrals: 


[facility], [Primary Care Provider] - Follow up with primary


Forms:  [employer] (Hong Konger), WORK NOTE

## 2018-05-10 NOTE — US
HISTORY:

pregnant, vaginal bleeding ; last menstrual period is reported 

03/20/2018 with estimated gestational age of 7 weeks 1 day. 



COMPARISON:

None available.



TECHNIQUE:

Transvaginal pelvic ultrasound was performed with longitudinal and 

transverse images submitted for interpretation.



FINDINGS:



UTERUS:

Measures 11.5 x 6.5 x 8.1 cm. There is a inhomogeneous but 

predominate hypoechoic mass identified at the lower uterine segment 

at the left side of the fundus bridging submucous and sub serosal 

locations measuring 4.2 x 3.6 x 3.7 cm.  Internal calcifications are 

identified shadowing posteriorly with the lesion compatible with a 

fibroid. There is an additional hypoechoic partially shadowing lesion 

at the right parasagittal fundus intramural in location measuring 1.2 

x 0.9 x 1.1 cm compatible with an additional fibroid. No additional 

myometrial lesion otherwise.



ENDOMETRIUM:

A gestational sac is identified within the endometrial cavity with 

yolk sac and fetal pole identified. Hypoechoic heterogeneous echo 

signals are appreciated at the anterosuperior edge of the chorion 

raising question of anterior decidual hemorrhage. The mean sac 

diameter is 2.0 cm with yolk sac measuring 0.24 cm and the fetal pole 

measuring 0.67 cm. This corresponds to estimated gestational age of 6 

weeks 4 days by gestational sac mean and fetal pole mean 

measurements. Cardiac activity is recorded 143 beats per minute. 

Estimated date of delivery 12/29/2018. Average ultrasonic age and 

estimated gestational age by LMP are concordant. 



CERVIX:

A few tiny nabothian cyst identified in the anterior cervix which is 

otherwise unremarkable appearing.



RIGHT OVARY:

Measures 2.3 x 1.3 x 1.4 cm. No solid mass. Normal flow. 



LEFT OVARY:

Measures 3.6 x 2.1 x 2.2 cm. No solid mass. Normal flow. A 2.3 x 1.4 

x 2.0 cm cyst is identified in the left ovary suggestive of a likely 

corpus luteum cyst.



FREE FLUID:

No significant free fluid noted.



OTHER FINDINGS:

None. 



IMPRESSION:

A single viable intrauterine gestation is identified with average 

ultrasonic age of 6 weeks 4 days which is concordant with LMP derived 

dates of 7 weeks 1 day. Fetal cardiac activity 143 beats per minute. 

Likely left ovarian corpus luteum cyst 2.3 cm. Potential moderate 

anterior decidual hemorrhage. Further clinical correlation 

recommended.  Follow ultrasonography is recommended as well.



Discordant preliminary report from [facility], 05/09/2018. PA review added 

in findings were discussed with Dr. Mccartney with written down read back 

verification 05/10/2018 11:45 a.m..

## 2018-05-17 ENCOUNTER — HOSPITAL ENCOUNTER (EMERGENCY)
Dept: HOSPITAL 42 - ED | Age: 38
Discharge: HOME | End: 2018-05-17
Payer: COMMERCIAL

## 2018-05-17 VITALS — RESPIRATION RATE: 18 BRPM | OXYGEN SATURATION: 98 % | TEMPERATURE: 98.2 F

## 2018-05-17 VITALS — DIASTOLIC BLOOD PRESSURE: 68 MMHG | SYSTOLIC BLOOD PRESSURE: 110 MMHG | HEART RATE: 78 BPM

## 2018-05-17 VITALS — BODY MASS INDEX: 26 KG/M2

## 2018-05-17 DIAGNOSIS — O20.0: Primary | ICD-10-CM

## 2018-05-17 DIAGNOSIS — Z3A.08: ICD-10-CM

## 2018-05-17 LAB
ALBUMIN SERPL-MCNC: 4.6 G/DL (ref 3–4.8)
ALBUMIN/GLOB SERPL: 1.4 {RATIO} (ref 1.1–1.8)
ALT SERPL-CCNC: 60 U/L (ref 7–56)
APPEARANCE UR: CLEAR
AST SERPL-CCNC: 36 U/L (ref 14–36)
BASOPHILS # BLD AUTO: 0.02 K/MM3 (ref 0–2)
BASOPHILS NFR BLD: 0.2 % (ref 0–3)
BILIRUB UR-MCNC: NEGATIVE MG/DL
BUN SERPL-MCNC: 6 MG/DL (ref 7–21)
CALCIUM SERPL-MCNC: 9.3 MG/DL (ref 8.4–10.5)
COLOR UR: (no result)
EOSINOPHIL # BLD: 0.1 10*3/UL (ref 0–0.7)
EOSINOPHIL NFR BLD: 0.5 % (ref 1.5–5)
ERYTHROCYTE [DISTWIDTH] IN BLOOD BY AUTOMATED COUNT: 13 % (ref 11.5–14.5)
GFR NON-AFRICAN AMERICAN: > 60
GLUCOSE UR STRIP-MCNC: NEGATIVE MG/DL
GRANULOCYTES # BLD: 9.83 10*3/UL (ref 1.4–6.5)
GRANULOCYTES NFR BLD: 75.6 % (ref 50–68)
HGB BLD-MCNC: 13.8 G/DL (ref 12–16)
LEUKOCYTE ESTERASE UR-ACNC: NEGATIVE LEU/UL
LYMPHOCYTES # BLD: 2.5 10*3/UL (ref 1.2–3.4)
LYMPHOCYTES NFR BLD AUTO: 19 % (ref 22–35)
MCH RBC QN AUTO: 29.2 PG (ref 25–35)
MCHC RBC AUTO-ENTMCNC: 36.1 G/DL (ref 31–37)
MCV RBC AUTO: 80.8 FL (ref 80–105)
MONOCYTES # BLD AUTO: 0.6 10*3/UL (ref 0.1–0.6)
MONOCYTES NFR BLD: 4.7 % (ref 1–6)
PH UR STRIP: 7 [PH] (ref 4.7–8)
PLATELET # BLD: 317 10^3/UL (ref 120–450)
PMV BLD AUTO: 9.4 FL (ref 7–11)
PROT UR STRIP-MCNC: NEGATIVE MG/DL
RBC # BLD AUTO: 4.73 10^6/UL (ref 3.5–6.1)
RBC # UR STRIP: (no result) /UL
RBC #/AREA URNS HPF: (no result) /HPF (ref 0–2)
SP GR UR STRIP: 1.01 (ref 1–1.03)
UROBILINOGEN UR STRIP-ACNC: 0.2 E.U./DL
WBC # BLD AUTO: 13 10^3/UL (ref 4.5–11)
WBC #/AREA URNS HPF: (no result) /HPF (ref 0–6)

## 2018-05-17 NOTE — ED PDOC
Arrival/HPI





- General


Chief Complaint: Female Genitourinary


Time Seen by Provider: 18 12:51





- History of Present Illness


Narrative History of Present Illness (Text): 





18 13:09


Patient is a 36 y/o F, approximately 8 weeks pregnant, presenting with vaginal 

bleeding.  Patient was seen in ED on  with u/s that showed IUP at 6 weeks 4 

days with FHR:143, with corpeus luteal cyst and ammended report that showed 

moderate anterior decidual hemorrhage.  At that time bhcg 39,754 and O+.  She 

was discharged home with monistat for yeast infection.  Patient reports that 

she was unable to follow-up with her ob/gyn due to lack of finances but 

scheduled appt on May 22 and May 24 for u/s and appt with Dr. Cinthya Patrick, [phone].  She reports that the vaginal itching resolved after monistat but 

patient reports that today she began to have increased abdominal cramping and 

vaginal bleeding with passage of clots.  Chart review shows patient was seen in 

[facility] ED on  with ultrasound and discharged with antibiotics for uti.  Has 

records of U/S on  ordered by Dr. Lind with IUP at 6w 2 days, FHR:122.








18 13:26








Past Medical History





- Provider Review


Nursing Documentation Reviewed: Yes





- Infectious Disease


Hx of Infectious Diseases: None





- Tetanus Immunization


Tetanus Immunization: Unknown





- Psychiatric


Hx Substance Use: No





- Anesthesia


Hx Anesthesia: No





Family/Social History





- Physician Review


Nursing Documentation Reviewed: Yes


Family/Social History: No Known Family HX


Smoking Status: Never Smoked


Hx Alcohol Use: No


Hx Substance Use: No





Allergies/Home Meds


Allergies/Adverse Reactions: 


Allergies





No Known Allergies Allergy (Verified 18 04:00)


 











Review of Systems





- Review of Systems


Constitutional: absent: Fatigue, Weight Change


Respiratory: absent: SOB, Cough, Sputum, Wheezing


Cardiovascular: absent: Chest Pain


Gastrointestinal: Abdominal Pain (cramping).  absent: Constipation, Diarrhea, 

Nausea, Vomiting


Genitourinary Female: Dysuria, Vaginal Bleeding


Skin: absent: Rash, Pruritis


Neurological: absent: Headache, Dizziness, Focal Weakness, Speech Changes





Physical Exam


Vital Signs Reviewed: Yes


Vital Signs











  Temp Pulse Resp BP Pulse Ox


 


 18 15:31   78  18  110/68  98


 


 18 12:59  98.2 F  85  18  108/71  98











Temperature: Afebrile


Blood Pressure: Normal


Pulse: Regular


Respiratory Rate: Normal


Appearance: Positive for: Well-Appearing, Non-Toxic, Comfortable


Pain Distress: None


Mental Status: Positive for: Alert and Oriented X 3





- Systems Exam


Head: Present: Atraumatic, Normocephalic


Pupils: Present: PERRL


Extroacular Muscles: Present: EOMI


Conjunctiva: Present: Normal


Mouth: Present: Moist Mucous Membranes


Neck: Present: Normal Range of Motion


Respiratory/Chest: Present: Clear to Auscultation, Good Air Exchange.  No: 

Respiratory Distress


Cardiovascular: Present: Regular Rate and Rhythm, Normal S1, S2.  No: Murmurs


Abdomen: No: Tenderness, Distention





Medical Decision Making


ED Course and Treatment: 





2018 14:35


Transvaginal Ultrasound


IMPRESSION: 


Single intrauterine gestation with normal cardiac activity whose menstrual age 

by ultrasound parameters is 8 weeks 3 days 0 weeks 4 days with an estimated 

date of delivery of 2018. This is concordant with the clinical dates of 8 

weeks 2 days with an estimated date of delivery per LMP of 2018.


No subchorionic or pre uterine hemorrhage appreciated. 


Uterine fibroids as referenced above. No continuity with gestational sac or 

decidual reaction appreciated. The largest fibroid is in the lower uterine 

segment as referenced above


Dictator: Samia Evans MD





18 14:51


BHCG increasing.  On reevaluation, patient reports bleeding has lessened.  

Abdomen soft NT/ND.  Used Azeri speaking Nurse Ghislaine and patient reports 

understanding of all discharge instructions.  


18 15:39








- Lab Interpretations


Lab Results: 








 18 13:12 





 18 13:12 





 Lab Results





18 14:50: Urine Color Straw, Urine Appearance Clear, Urine pH 7.0, Ur 

Specific Gravity 1.010, Urine Protein Negative, Urine Glucose (UA) Negative, 

Urine Ketones Trace H, Urine Blood Large H, Urine Nitrate Negative, Urine 

Bilirubin Negative, Urine Urobilinogen 0.2, Ur Leukocyte Esterase Negative, 

Urine RBC 20 - 25, Urine WBC 0 - 2, Ur Epithelial Cells 1 - 3


18 13:12: Beta HCG, Quant [id number] H


18 13:12: Sodium 143, Potassium 3.9, Chloride 106, Carbon Dioxide 21, 

Anion Gap 20, BUN 6 L, Creatinine 0.5 L, Est GFR ( Amer) > 60, Est GFR (

Non-Af Amer) > 60, Random Glucose 96, Calcium 9.3, Total Bilirubin 0.4, AST 36, 

ALT 60 H, Alkaline Phosphatase 54, Total Protein 7.9, Albumin 4.6, Globulin 3.3

, Albumin/Globulin Ratio 1.4


18 13:12: WBC 13.0 H, RBC 4.73, Hgb 13.8, Hct 38.2, MCV 80.8, MCH 29.2, 

MCHC 36.1, RDW 13.0, Plt Count 317, MPV 9.4, Gran % 75.6 H, Lymph % (Auto) 19.0 

L, Mono % (Auto) 4.7, Eos % (Auto) 0.5 L, Baso % (Auto) 0.2, Gran # 9.83 H, 

Lymph # (Auto) 2.5, Mono # (Auto) 0.6, Eos # (Auto) 0.1, Baso # (Auto) 0.02








I have reviewed the lab results: Yes





- RAD Interpretation


Radiology Orders: 








18 12:54


OB TRANSVAGINAL PREGNANCY [US] Stat 














Disposition/Present on Arrival





- Present on Arrival


Any Indicators Present on Arrival: No


History of DVT/PE: No


History of Uncontrolled Diabetes: No


Urinary Catheter: No


History of Decub. Ulcer: No


History Surgical Site Infection Following: None





- Disposition


Have Diagnosis and Disposition been Completed?: Yes


Diagnosis: 


 Threatened 





Disposition: HOME/ ROUTINE


Disposition Time: 14:40


Patient Plan: Discharge


Patient Problems: 


 Current Active Problems











Problem Status Onset


 


Threatened  Acute  











Condition: GOOD


Discharge Instructions (ExitCare):  Threatened Miscarriage


Print Language: Northern Irish


Additional Instructions: 


Follow-up with your OB/gyn within 3 days.  Return to ED if condition worsens


Referrals: 


Neva Tobin, [Primary Care Provider] - Follow up with primary


Forms:  [employer] (English)

## 2018-05-17 NOTE — US
HISTORY:

vaginal bleeding, pregnant LMP 3/20/2018. 



COMPARISON:

5/9/2018



TECHNIQUE:

Transvaginal



FINDINGS:



UTERUS:

Measures anteverted uterus measuring 12.9 x 7.6 x 9.5 cm. Prior 

fibroids renoted the largest is in the lower uterine segment this 

appears near transmural and measures 5.2 x 4.1 x 5.0 cm. It appears 

removed from the more cephalad intrauterine gestational sac.



The smaller intramural fibroid projects posteriorly on this exam- 

this may be due to orientation of the transducer -on this exam it 

measures 1.2 x 0.8 x 1.0 cm. No continuity with the gestational sac 

or decidual reaction suggested.



A single intrauterine gestational sac with fetal pole and yolk sac 

are present. Gestational sac mean diameter is 3.5 cm. Yolk sac is 0.3 

cm. Fetal pole crown-rump length is 1.85 cm 



Fetus/embryonic heart activity is 169 beats per minute. 



On this exam no subchorionic or intrauterine hemorrhage is seen. 



The previously referenced anterior uterine fibroid is not visualized 

on this exam. This can sometimes be due to positioning/ orientation 

of the transducer 



CERVIX:

Multiple clustered sub cm nabothian cyst noted cervical length is 3.6 

cm.



RIGHT OVARY:

Measures 3.0 x 2.7 x 1.2 cm. No solid mass. Normal flow. 



LEFT OVARY:

Not seen. Prior left corpus luteal cyst is not identified.



FREE FLUID:

No significant free fluid noted.



OTHER FINDINGS:

None. 



IMPRESSION:

Single intrauterine gestation with normal cardiac activity whose 

menstrual age by ultrasound parameters is 8 weeks 3 days 0 weeks 4 

days with an estimated date of delivery of 12/24/2018. This is 

concordant with the clinical dates of 8 weeks 2 days with an 

estimated date of delivery per LMP of 12/25/2018.



No subchorionic or pre uterine hemorrhage appreciated. 



Uterine fibroids as referenced above. No continuity with gestational 

sac or decidual reaction appreciated. The largest fibroid is in the 

lower uterine segment as referenced above

## 2019-04-26 ENCOUNTER — HOSPITAL ENCOUNTER (EMERGENCY)
Dept: HOSPITAL 42 - ED | Age: 39
Discharge: HOME | End: 2019-04-26
Payer: COMMERCIAL

## 2019-04-26 VITALS
SYSTOLIC BLOOD PRESSURE: 105 MMHG | RESPIRATION RATE: 17 BRPM | DIASTOLIC BLOOD PRESSURE: 72 MMHG | HEART RATE: 73 BPM | OXYGEN SATURATION: 97 %

## 2019-04-26 VITALS — TEMPERATURE: 98 F

## 2019-04-26 VITALS — BODY MASS INDEX: 35.9 KG/M2

## 2019-04-26 DIAGNOSIS — N39.0: Primary | ICD-10-CM

## 2019-04-26 DIAGNOSIS — D25.9: ICD-10-CM

## 2019-04-26 DIAGNOSIS — N28.89: ICD-10-CM

## 2019-04-26 DIAGNOSIS — N23: ICD-10-CM

## 2019-04-26 LAB
ALBUMIN SERPL-MCNC: 4 G/DL (ref 3–4.8)
ALBUMIN/GLOB SERPL: 1.3 {RATIO} (ref 1.1–1.8)
ALT SERPL-CCNC: 19 U/L (ref 7–56)
AMORPH SED URNS QL MICRO: (no result) /HPF
APPEARANCE UR: CLEAR
APTT BLD: 32.3 SECONDS (ref 26.9–38.3)
AST SERPL-CCNC: 18 U/L (ref 14–36)
BACTERIA #/AREA URNS HPF: (no result) /HPF
BASOPHILS # BLD AUTO: 0.01 K/MM3 (ref 0–2)
BASOPHILS NFR BLD: 0.1 % (ref 0–3)
BILIRUB UR-MCNC: NEGATIVE MG/DL
BUN SERPL-MCNC: 7 MG/DL (ref 7–21)
CALCIUM SERPL-MCNC: 8.7 MG/DL (ref 8.4–10.5)
COLOR UR: YELLOW
EOSINOPHIL # BLD: 0.1 10*3/UL (ref 0–0.7)
EOSINOPHIL NFR BLD: 1.3 % (ref 1.5–5)
ERYTHROCYTE [DISTWIDTH] IN BLOOD BY AUTOMATED COUNT: 12.8 % (ref 11.5–14.5)
ERYTHROCYTE [SEDIMENTATION RATE] IN BLOOD: 20 MM/HR (ref 0–20)
GFR NON-AFRICAN AMERICAN: > 60
GLUCOSE UR STRIP-MCNC: NEGATIVE MG/DL
HGB BLD-MCNC: 13 G/DL (ref 12–16)
INR PPP: 1.06
LEUKOCYTE ESTERASE UR-ACNC: (no result) LEU/UL
LYMPHOCYTES # BLD: 1.6 10*3/UL (ref 1.2–3.4)
LYMPHOCYTES NFR BLD AUTO: 21.3 % (ref 22–35)
MCH RBC QN AUTO: 27.8 PG (ref 25–35)
MCHC RBC AUTO-ENTMCNC: 34.5 G/DL (ref 31–37)
MCV RBC AUTO: 80.7 FL (ref 80–105)
MONOCYTES # BLD AUTO: 0.4 10*3/UL (ref 0.1–0.6)
MONOCYTES NFR BLD: 5.2 % (ref 1–6)
PH UR STRIP: 6.5 [PH] (ref 4.7–8)
PLATELET # BLD: 274 10^3/UL (ref 120–450)
PMV BLD AUTO: 9.1 FL (ref 7–11)
PROT UR STRIP-MCNC: NEGATIVE MG/DL
PROTHROMBIN TIME: 12 SECONDS (ref 9.4–12.5)
RBC # BLD AUTO: 4.67 10^6/UL (ref 3.5–6.1)
RBC # UR STRIP: NEGATIVE /UL
RBC #/AREA URNS HPF: (no result) /HPF (ref 0–2)
SP GR UR STRIP: 1.01 (ref 1–1.03)
UROBILINOGEN UR STRIP-ACNC: 0.2 E.U./DL
WBC # BLD AUTO: 7.5 10^3/UL (ref 4.5–11)

## 2019-04-26 PROCEDURE — 96365 THER/PROPH/DIAG IV INF INIT: CPT

## 2019-04-26 PROCEDURE — 85730 THROMBOPLASTIN TIME PARTIAL: CPT

## 2019-04-26 PROCEDURE — 99284 EMERGENCY DEPT VISIT MOD MDM: CPT

## 2019-04-26 PROCEDURE — 87086 URINE CULTURE/COLONY COUNT: CPT

## 2019-04-26 PROCEDURE — 83735 ASSAY OF MAGNESIUM: CPT

## 2019-04-26 PROCEDURE — 81025 URINE PREGNANCY TEST: CPT

## 2019-04-26 PROCEDURE — 81001 URINALYSIS AUTO W/SCOPE: CPT

## 2019-04-26 PROCEDURE — 85610 PROTHROMBIN TIME: CPT

## 2019-04-26 PROCEDURE — 85651 RBC SED RATE NONAUTOMATED: CPT

## 2019-04-26 PROCEDURE — 74177 CT ABD & PELVIS W/CONTRAST: CPT

## 2019-04-26 PROCEDURE — 96375 TX/PRO/DX INJ NEW DRUG ADDON: CPT

## 2019-04-26 PROCEDURE — 80053 COMPREHEN METABOLIC PANEL: CPT

## 2019-04-26 PROCEDURE — 85025 COMPLETE CBC W/AUTO DIFF WBC: CPT

## 2019-04-26 NOTE — CT
Date of service: 04/26/2019



PROCEDURE:  CT Abdomen and Pelvis with contrast



HISTORY:

L CVA tenderness w/ dx renal mass



COMPARISON:

None available.



TECHNIQUE:

Contrast dose: 150 mL Omnipaque 350 IV 



Radiation dose:



Total exam DLP = 1063.51 mGy-cm.



This CT exam was performed using one or more of the following dose 

reduction techniques: Automated exposure control, adjustment of the 

mA and/or kV according to patient size, and/or use of iterative 

reconstruction technique.



FINDINGS:



LOWER THORAX:

No visible consolidation, pleural effusion, or pneumothorax.



LIVER:

Hypoattenuation of the liver compatible with hepatic steatosis. 



GALLBLADDER AND BILE DUCTS:

Unremarkable. 



PANCREAS:

Unremarkable. 



SPLEEN:

At least 2 probable sub cm splenules.  Otherwise unremarkable. 



ADRENALS:

Unremarkable. No mass. 



KIDNEYS AND URETERS:

The kidneys enhance symmetrically. No hydronephrosis or obstructing 

calculus identified.  Too small to characterize 5 mm right renal 

hypodensity; statistically likely cysts. 



VASCULATURE:

No aortic aneurysm. No atherosclerotic calcification or mural plaque 

present.



BOWEL:

Stomach is nondistended.  



Lack of oral contrast limits evaluation for bowel pathology.  Bowel 

loops appear within normal limits of caliber without evidence of 

obstruction.



APPENDIX:

The appendix appears within normal limits of caliber. No secondary 

signs of acute appendicitis.



PERITONEUM:

No significant free fluid.  No definite free air. 



LYMPH NODES:

No bulky adenopathy identified. 



BLADDER:

Unremarkable. 



REPRODUCTIVE:

Fibroid uterus. 



BONES:

No acute osseous abnormality is detected. 



OTHER FINDINGS:

None.



IMPRESSION:

Too small to characterize right renal hypodensity; statistically 

likely cyst.  Correlate with prior outside imaging if available.



Hypoattenuation of the liver compatible with hepatic steatosis. 



Fibroid uterus. 



Additional findings as above.

## 2022-10-20 NOTE — ED PDOC
Arrival/HPI





- General


Chief Complaint: Female Genitourinary


Historian: Patient





- History of Present Illness


Narrative History of Present Illness (Text): 





04/26/19 09:09


38 year old female , with a Past medical history of fibroid uterus, left kidney 

tumor and miscarriage at 2 months in 2005, who presents to the emergency 

department complaining of left lower back pain x 10 months. Patient reports pain

radiates to her left knees and feet, and states she has difficulty walking. 

Patient also  reports she went to a hospital in [facility] in June 2018, where 

she was told she had a tumor in her left kidney. She reports she has never seen 

a doctor here in [place], and states it was due to her pregnancy. She endorses 

difficulty urinating and dysuria. She denies any fevers, chills, headaches, 

dizziness, shortness of breath, nausea, hematuria, chest pain, or any other 

somatic complaints. She reports her LNMP was last month. 





PMD: None








Time/Duration: > month


Symptom Onset: Gradual


Symptom Course: Unchanged


Activities at Onset: Light


Context: Home





Past Medical History





- Provider Review


Nursing Documentation Reviewed: Yes





- Infectious Disease


Hx of Infectious Diseases: None





- Tetanus Immunization


Tetanus Immunization: Unknown





- Psychiatric


Hx Substance Use: No





- Anesthesia


Hx Anesthesia: No





Family/Social History





- Physician Review


Nursing Documentation Reviewed: Yes


Family/Social History: Unknown Family HX


Smoking Status: Never Smoked


Hx Alcohol Use: No


Hx Substance Use: No





Allergies/Home Meds


Allergies/Adverse Reactions: 


Allergies





No Known Allergies Allergy (Verified 04/26/19 08:21)


   











Review of Systems





- Physician Review


All systems were reviewed & negative as marked: Yes





- Review of Systems


Constitutional: absent: Fevers


Respiratory: absent: SOB, Cough


Cardiovascular: absent: Chest Pain


Gastrointestinal: absent: Nausea, Vomiting


Genitourinary Female: Dysuria, Other (+difficulty urinating)


Musculoskeletal: absent: Back Pain, Neck Pain


Neurological: absent: Headache, Dizziness


Endocrine: absent: Diaphoresis





Physical Exam


Vital Signs Reviewed: Yes





Vital Signs











  Temp Pulse Resp BP Pulse Ox


 


 04/26/19 08:22  97.9 F  83  17  110/70  96











Temperature: Afebrile


Blood Pressure: Normal


Pulse: Regular


Respiratory Rate: Normal


Appearance: Positive for: Well-Appearing, Non-Toxic, Comfortable


Pain Distress: None


Mental Status: Positive for: Alert and Oriented X 3





- Systems Exam


Head: Present: Atraumatic, Normocephalic


Pupils: Present: PERRL


Extroacular Muscles: Present: EOMI


Conjunctiva: Present: Normal


Mouth: Present: Moist Mucous Membranes


Neck: Present: Normal Range of Motion


Respiratory/Chest: Present: Clear to Auscultation, Good Air Exchange.  No: 

Respiratory Distress, Accessory Muscle Use


Cardiovascular: Present: Regular Rate and Rhythm, Normal S1, S2.  No: Murmurs


Abdomen: Present: Other (+periumbilical pain).  No: Tenderness, Distention, 

Peritoneal Signs


Back: Present: Normal Inspection, CVA Tenderness


Upper Extremity: Present: Normal Inspection.  No: Cyanosis, Edema


Lower Extremity: Present: Normal Inspection.  No: Edema


Neurological: Present: GCS=15, Speech Normal


Skin: Present: Warm, Dry, Normal Color.  No: Rashes


Psychiatric: Present: Alert, Oriented x 3, Normal Insight, Normal Concentration





Medical Decision Making


ED Course and Treatment: 





04/26/19 09:05


Impression:


38 year old female presents to the emergency department complaining of left 

lower back pain x 10 months.





Differential Diagnosis included but are not limited to:  


--Pyelonephritis


--Kidney Stone





Plan:


-- Labs


-- CT A&P


-- Urinalysis


-- Urirne preg test


-- Urine culture


-- Reassess and disposition





Prior Visits:


Notes and results from previous visits were reviewed. 





Progress Notes:


04/26/19 10:55


Labs show no evidence of leukocytosis. UA shows trace leukocytes and many 

bacteria. Rocephin hanging. 








- Lab Interpretations


Lab Results: 














                                 04/26/19 09:20 





                                 04/26/19 09:20 





                                   Lab Results





04/26/19 09:20: Sodium 141, Potassium 3.8, Chloride 108 H, Carbon Dioxide 24, 

Anion Gap 13, BUN 7, Creatinine 0.4 L, Est GFR ( Amer) > 60, Est GFR 

(Non-Af Amer) > 60, Random Glucose 95, Calcium 8.7, Magnesium 2.0, Total 

Bilirubin 0.4, AST 18, ALT 19, Alkaline Phosphatase 61, Total Protein 7.1, 

Albumin 4.0, Globulin 3.1, Albumin/Globulin Ratio 1.3


04/26/19 09:20: PT 12.0, INR 1.06, APTT 32.3


04/26/19 09:20: WBC 7.5, RBC 4.67, Hgb 13.0, Hct 37.7, MCV 80.7, MCH 27.8, MCHC 

34.5, RDW 12.8, Plt Count 274, MPV 9.1, Neut % (Auto) 72.1 H, Lymph % (Auto) 

21.3 L, Mono % (Auto) 5.2, Eos % (Auto) 1.3 L, Baso % (Auto) 0.1, Lymph # (Auto)

1.6, Mono # (Auto) 0.4, Eos # (Auto) 0.1, Baso # (Auto) 0.01, Absolute Neuts 

(auto) 5.41, ESR 20


04/26/19 09:20: Urine Color Yellow, Urine Appearance Clear, Urine pH 6.5, Ur 

Specific Gravity 1.010, Urine Protein Negative, Urine Glucose (UA) Negative, 

Urine Ketones Negative, Urine Blood Negative, Urine Nitrate Negative, Urine 

Bilirubin Negative, Urine Urobilinogen 0.2, Ur Leukocyte Esterase Trace H, Urine

RBC 0 - 2, Urine WBC 2 - 5, Ur Epithelial Cells 6 - 8 H, Amorphous Sediment Few,

Urine Bacteria Many








I have reviewed the lab results: Yes





- RAD Interpretation


Narrative RAD Interpretations (Text): 





04/26/19 11:03


CT abdomen and pelvis reviewed by Nuzhat Matt MD, shows:


FINDINGS:


LOWER THORAX:


No visible consolidation, pleural effusion, or pneumothorax.


LIVER:


Hypoattenuation of the liver compatible with hepatic steatosis. 


GALLBLADDER AND BILE DUCTS:


Unremarkable. 


PANCREAS:


Unremarkable. 


SPLEEN:


At least 2 probable sub cm splenules.  Otherwise unremarkable. 


ADRENALS:


Unremarkable. No mass. 


KIDNEYS AND URETERS:


The kidneys enhance symmetrically. No hydronephrosis or obstructing calculus 

identified.  Too small to characterize 5 mm right renal hypodensity; 

statistically likely cysts. 


VASCULATURE:


No aortic aneurysm. No atherosclerotic calcification or mural plaque present.


BOWEL:


Stomach is nondistended.  


Lack of oral contrast limits evaluation for bowel pathology.  Bowel loops appear

within normal limits of caliber without evidence of obstruction.


APPENDIX:


The appendix appears within normal limits of caliber. No secondary signs of 

acute appendicitis.


PERITONEUM:


No significant free fluid.  No definite free air. 


LYMPH NODES:


No bulky adenopathy identified. 


BLADDER:


Unremarkable. 


REPRODUCTIVE:


Fibroid uterus. 


BONES:


No acute osseous abnormality is detected. 


OTHER FINDINGS:


None.


IMPRESSION:


Too small to characterize right renal hypodensity; statistically likely cyst.  

Correlate with prior outside imaging if available.


Hypoattenuation of the liver compatible with hepatic steatosis. 


Fibroid uterus. 


Additional findings as above.








Radiology Orders: 











04/26/19 09:03


ABD & PELVIS IV CONTRAST ONLY [CT] Stat 











: Radiologist





- Teodoroibdariusz Statement


The provider has reviewed the documentation as recorded by the Scribe





Mila Dimas





All medical record entries made by the Scribe were at my direction and 

personally dictated by me. I have reviewed the chart and agree that the record 

accurately reflects my personal performance of the history, physical exam, 

medical decision making, and the department course for this patient. I have also

 personally directed, reviewed, and agree with the discharge instructions and 

disposition.








Disposition/Present on Arrival





- Present on Arrival


Any Indicators Present on Arrival: No


History of DVT/PE: No


History of Uncontrolled Diabetes: No


Urinary Catheter: No


History of Decub. Ulcer: No


History Surgical Site Infection Following: None





- Disposition


Have Diagnosis and Disposition been Completed?: Yes


Diagnosis: 


 UTI (urinary tract infection), Renal colic on left side





Disposition: HOME/ ROUTINE


Disposition Time: 10:59


Patient Plan: Discharge


Patient Problems: 


                             Current Active Problems











Problem Status Onset


 


Renal colic on left side Acute 


 


UTI (urinary tract infection) Acute 











Condition: STABLE


Discharge Instructions (ExitCare):  Urinary Tract Infection, Adult (DC), Kidney 

Infection (DC)


Print Language: [place]


Additional Instructions: 





All medical record entries made by the Scribe were at my direction and 

personally dictated by me. I have reviewed the chart and agree that the record 

accurately reflects my personal performance of the history, physical exam, medic

al decision making, and the department course for this patient. I have also 

personally directed, reviewed, and agree with the discharge instructions and 

disposition.





Please follow up with the PCP listed in your discharge paperwork


Please take medications as prescribed


Prescriptions: 


Cephalexin [cephalexin] 500 mg PO BID 5 Days #10 cap


Naproxen 500 mg PO BID #10 tab


Referrals: 


Mary Jane Franco DO [[facility]] - Follow up with primary


Forms:  [employer] (Yoruba)
No